# Patient Record
Sex: MALE | Race: WHITE
[De-identification: names, ages, dates, MRNs, and addresses within clinical notes are randomized per-mention and may not be internally consistent; named-entity substitution may affect disease eponyms.]

---

## 2018-01-19 ENCOUNTER — HOSPITAL ENCOUNTER (EMERGENCY)
Dept: HOSPITAL 17 - PHED | Age: 78
Discharge: LEFT BEFORE DISCHARGE | End: 2018-01-19
Payer: MEDICARE

## 2018-01-19 VITALS — WEIGHT: 216.05 LBS | HEIGHT: 72 IN | BODY MASS INDEX: 29.26 KG/M2

## 2018-01-19 VITALS
OXYGEN SATURATION: 95 % | SYSTOLIC BLOOD PRESSURE: 103 MMHG | DIASTOLIC BLOOD PRESSURE: 59 MMHG | RESPIRATION RATE: 18 BRPM | HEART RATE: 71 BPM

## 2018-01-19 VITALS
RESPIRATION RATE: 16 BRPM | SYSTOLIC BLOOD PRESSURE: 144 MMHG | DIASTOLIC BLOOD PRESSURE: 67 MMHG | TEMPERATURE: 97.5 F | HEART RATE: 69 BPM | OXYGEN SATURATION: 94 %

## 2018-01-19 DIAGNOSIS — Z53.21: ICD-10-CM

## 2018-01-19 DIAGNOSIS — K62.5: Primary | ICD-10-CM

## 2018-01-19 DIAGNOSIS — E11.9: ICD-10-CM

## 2018-01-19 DIAGNOSIS — N28.9: ICD-10-CM

## 2018-01-19 DIAGNOSIS — R11.10: ICD-10-CM

## 2018-01-19 DIAGNOSIS — Z79.82: ICD-10-CM

## 2018-01-19 DIAGNOSIS — I10: ICD-10-CM

## 2018-01-19 LAB
ALBUMIN SERPL-MCNC: 3.5 GM/DL (ref 3.4–5)
ALP SERPL-CCNC: 160 U/L (ref 45–117)
ALT SERPL-CCNC: 14 U/L (ref 12–78)
AST SERPL-CCNC: 16 U/L (ref 15–37)
BASOPHILS # BLD AUTO: 0.1 TH/MM3 (ref 0–0.2)
BASOPHILS NFR BLD: 0.6 % (ref 0–2)
BILIRUB SERPL-MCNC: 0.8 MG/DL (ref 0.2–1)
BUN SERPL-MCNC: 19 MG/DL (ref 7–18)
CALCIUM SERPL-MCNC: 9 MG/DL (ref 8.5–10.1)
CHLORIDE SERPL-SCNC: 100 MEQ/L (ref 98–107)
CREAT SERPL-MCNC: 1.5 MG/DL (ref 0.6–1.3)
EOSINOPHIL # BLD: 0.2 TH/MM3 (ref 0–0.4)
EOSINOPHIL NFR BLD: 2.7 % (ref 0–4)
ERYTHROCYTE [DISTWIDTH] IN BLOOD BY AUTOMATED COUNT: 13.3 % (ref 11.6–17.2)
GFR SERPLBLD BASED ON 1.73 SQ M-ARVRAT: 46 ML/MIN (ref 89–?)
GLUCOSE SERPL-MCNC: 176 MG/DL (ref 74–106)
HCO3 BLD-SCNC: 29.8 MEQ/L (ref 21–32)
HCT VFR BLD CALC: 43.8 % (ref 39–51)
HGB BLD-MCNC: 13.9 GM/DL (ref 13–17)
INR PPP: 1 RATIO
LYMPHOCYTES # BLD AUTO: 1.4 TH/MM3 (ref 1–4.8)
LYMPHOCYTES NFR BLD AUTO: 16.9 % (ref 9–44)
MCH RBC QN AUTO: 28.2 PG (ref 27–34)
MCHC RBC AUTO-ENTMCNC: 31.8 % (ref 32–36)
MCV RBC AUTO: 88.8 FL (ref 80–100)
MONOCYTE #: 0.6 TH/MM3 (ref 0–0.9)
MONOCYTES NFR BLD: 6.9 % (ref 0–8)
NEUTROPHILS # BLD AUTO: 6.1 TH/MM3 (ref 1.8–7.7)
NEUTROPHILS NFR BLD AUTO: 72.9 % (ref 16–70)
PLATELET # BLD: 309 TH/MM3 (ref 150–450)
PMV BLD AUTO: 7.8 FL (ref 7–11)
PROT SERPL-MCNC: 7.2 GM/DL (ref 6.4–8.2)
PROTHROMBIN TIME: 10.2 SEC (ref 9.8–11.6)
RBC # BLD AUTO: 4.93 MIL/MM3 (ref 4.5–5.9)
SODIUM SERPL-SCNC: 137 MEQ/L (ref 136–145)
WBC # BLD AUTO: 8.4 TH/MM3 (ref 4–11)

## 2018-01-19 PROCEDURE — 85730 THROMBOPLASTIN TIME PARTIAL: CPT

## 2018-01-19 PROCEDURE — 96375 TX/PRO/DX INJ NEW DRUG ADDON: CPT

## 2018-01-19 PROCEDURE — 86850 RBC ANTIBODY SCREEN: CPT

## 2018-01-19 PROCEDURE — 85610 PROTHROMBIN TIME: CPT

## 2018-01-19 PROCEDURE — 85025 COMPLETE CBC W/AUTO DIFF WBC: CPT

## 2018-01-19 PROCEDURE — 96374 THER/PROPH/DIAG INJ IV PUSH: CPT

## 2018-01-19 PROCEDURE — 99284 EMERGENCY DEPT VISIT MOD MDM: CPT

## 2018-01-19 PROCEDURE — 96361 HYDRATE IV INFUSION ADD-ON: CPT

## 2018-01-19 PROCEDURE — 86901 BLOOD TYPING SEROLOGIC RH(D): CPT

## 2018-01-19 PROCEDURE — C9113 INJ PANTOPRAZOLE SODIUM, VIA: HCPCS

## 2018-01-19 PROCEDURE — 86900 BLOOD TYPING SEROLOGIC ABO: CPT

## 2018-01-19 PROCEDURE — 80053 COMPREHEN METABOLIC PANEL: CPT

## 2018-01-19 NOTE — PD
HPI


Chief Complaint:  GI Complaint


Time Seen by Provider:  08:19


Travel History


International Travel<30 days:  No


Contact w/Intl Traveler<30days:  No


Traveled to known affect area:  No





History of Present Illness


HPI


77-year-old male states a couple months ago he had bright red bleeding per 

rectum but it went away and he did not see a physician for it.  He states again 

over the past couple of days he's had bright red bleeding per rectum.  He 

states he's also had a couple episodes of nonbloody emesis.  He denies any 

abdominal pain, fever or other concurrent complaints.  He denies taking any over

-the-counter anti-inflammatories but states that he has been on tramadol for 

her new pain medication.  Quality is bright red.  He states he's wearing a 

depends because he is having bleeding even when he is not going to the 

bathroom.  He feels worse when he moves around.  He denies other modifying 

factors.  Quality is bright red.  Severity is multiple episodes.





PFSH


Past Medical History


Hx Anticoagulant Therapy:  Yes (ASA 81MG DAILY)


Arthritis:  Yes


Blood Disorders:  No


Cancer:  No


Cardiovascular Problems:  No


High Cholesterol:  Yes


Chemotherapy:  No


Cerebrovascular Accident:  Yes (TIA 2003)


Diabetes:  Yes


Patient Takes Glucophage:  Yes


Endocrine:  Yes


Genitourinary:  Yes


Hypertension:  Yes


Immune Disorder:  No


Musculoskeletal:  Yes


Neurologic:  No


Psychiatric:  No


Reproductive:  No


Respiratory:  No


Migraines:  No


Radiation Therapy:  No


Seizures:  No


Sickle Cell Disease:  No





Past Surgical History


Abdominal Aneurysm Repair:  Yes


Abdominal Surgery:  Yes (HERNIA REPAIR)


Cardiac Surgery:  No


Ear Surgery:  No


Endocrine Surgery:  No


Eye Surgery:  No


Genitourinary Surgery:  Yes (PROSTATE REMOVAL)


Gynecologic Surgery:  No


Oral Surgery:  No


Thoracic Surgery:  No


Other Surgery:  Yes (PROSTATE CA REMOVED/L HERNIA REPAIR)





Social History


Alcohol Use:  No


Tobacco Use:  No


Substance Use:  No





Allergies-Medications


(Allergen,Severity, Reaction):  


Coded Allergies:  


     No Known Allergies (Verified , 2/2/16)


Reported Meds & Prescriptions





Reported Meds & Active Scripts


Active


Reported


Hydrochlorothiazide 25 Mg Tab 25 Mg PO BID


Atorvastatin (Atorvastatin Calcium) 40 Mg Tab 40 Mg PO HS


Tramadol (Tramadol HCl) 50 Mg Tab 50 Mg PO Q4H PRN


Glipizide 10 Mg Tab 10 Mg PO BIDAC


     Take 30 minutes before a meal


Lupron Depot Inj Kit (Leuprolide (3 Month) Inj Kit) 11.25 Mg Kit 11.25 Mg IM 

Q90D


Aspirin Low Dose (Aspirin) 81 Mg Chew 81 Mg CHEW DAILY


Metformin (Metformin HCl) 500 Mg Tab 500 Mg PO BIDPC








Review of Systems


Except as stated in HPI:  all other systems reviewed are Neg





Physical Exam


Narrative


GENERAL: Well-nourished, well-developed patient.


SKIN: Warm and dry.


HEAD: Normocephalic and atraumatic.


EYES: No injection or drainage. 


ENT: No nasal drainage noted. 


NECK: Supple, trachea midline.


CARDIOVASCULAR: Regular rate and rhythm 


RESPIRATORY: Breath sounds equal bilaterally. No accessory muscle use.


GASTROINTESTINAL: Abdomen soft, non-tender, nondistended. 


RECTAL EXAM: Performed with chaperone and after permission. No large external 

hemorrhoid or fissure, stool is bloody


NEUROLOGICAL: Awake. Motor and sensory grossly within normal limits. Normal 

speech.





Data


Data


Last Documented VS





Vital Signs








  Date Time  Temp Pulse Resp B/P (MAP) Pulse Ox O2 Delivery O2 Flow Rate FiO2


 


1/19/18 08:39  71 18 103/59 (74) 95 Room Air  


 


1/19/18 08:08 97.5       








Orders





 Orders


Complete Blood Count With Diff (1/19/18 08:19)


Comprehensive Metabolic Panel (1/19/18 08:19)


Prothrombin Time / Inr (Pt) (1/19/18 08:19)


Act Partial Throm Time (Ptt) (1/19/18 08:19)


Type And Screen (1/19/18 08:19)


Ecg Monitoring (1/19/18 08:19)


Iv Access Insert/Monitor (1/19/18 08:19)


Oximetry (1/19/18 08:19)


Sodium Chloride 0.9% Flush (Ns Flush) (1/19/18 08:30)


Ondansetron Inj (Zofran Inj) (1/19/18 09:00)


Sodium Chlorid 0.9% 500 Ml Inj (Ns 500 M (1/19/18 09:00)


Pantoprazole Inj (Protonix Inj) (1/19/18 09:00)


Ed Discharge Order (1/19/18 11:13)





Labs





Laboratory Tests








Test


  1/19/18


08:38


 


White Blood Count 8.4 TH/MM3 


 


Red Blood Count 4.93 MIL/MM3 


 


Hemoglobin 13.9 GM/DL 


 


Hematocrit 43.8 % 


 


Mean Corpuscular Volume 88.8 FL 


 


Mean Corpuscular Hemoglobin 28.2 PG 


 


Mean Corpuscular Hemoglobin


Concent 31.8 % 


 


 


Red Cell Distribution Width 13.3 % 


 


Platelet Count 309 TH/MM3 


 


Mean Platelet Volume 7.8 FL 


 


Neutrophils (%) (Auto) 72.9 % 


 


Lymphocytes (%) (Auto) 16.9 % 


 


Monocytes (%) (Auto) 6.9 % 


 


Eosinophils (%) (Auto) 2.7 % 


 


Basophils (%) (Auto) 0.6 % 


 


Neutrophils # (Auto) 6.1 TH/MM3 


 


Lymphocytes # (Auto) 1.4 TH/MM3 


 


Monocytes # (Auto) 0.6 TH/MM3 


 


Eosinophils # (Auto) 0.2 TH/MM3 


 


Basophils # (Auto) 0.1 TH/MM3 


 


CBC Comment DIFF FINAL 


 


Differential Comment  


 


Prothrombin Time 10.2 SEC 


 


Prothromb Time International


Ratio 1.0 RATIO 


 


 


Activated Partial


Thromboplast Time 26.7 SEC 


 


 


Blood Urea Nitrogen 19 MG/DL 


 


Creatinine 1.50 MG/DL 


 


Random Glucose 176 MG/DL 


 


Total Protein 7.2 GM/DL 


 


Albumin 3.5 GM/DL 


 


Calcium Level 9.0 MG/DL 


 


Alkaline Phosphatase 160 U/L 


 


Aspartate Amino Transf


(AST/SGOT) 16 U/L 


 


 


Alanine Aminotransferase


(ALT/SGPT) 14 U/L 


 


 


Total Bilirubin 0.8 MG/DL 


 


Sodium Level 137 MEQ/L 


 


Potassium Level 3.9 MEQ/L 


 


Chloride Level 100 MEQ/L 


 


Carbon Dioxide Level 29.8 MEQ/L 


 


Anion Gap 7 MEQ/L 


 


Estimat Glomerular Filtration


Rate 46 ML/MIN 


 











MDM


Medical Decision Making


Medical Screen Exam Complete:  Yes


Emergency Medical Condition:  Yes


Medical Record Reviewed:  Yes (past history confirmed)


Interpretation(s)


CBC & BMP Diagram


1/19/18 08:38








Total Protein 7.2, Albumin 3.5, Calcium Level 9.0, Alkaline Phosphatase 160 H, 

Aspartate Amino Transf (AST/SGOT) 16, Alanine Aminotransferase (ALT/SGPT) 14, 

Total Bilirubin 0.8


Differential Diagnosis


Diverticulosis, gastroenteritis, hemorrhoid, anemia, radiation colitis


Narrative Course


Will check blood work and reevaluate.  Dose with IV fluids and Zofran and 

Protonix





Reviewed labs, will discuss with GI





Patient's wife is now here and lengthy discussion with patient and he does not 

want to stay in the hospital for any more testing.  We'll discuss with his 

primary care physician





ASAEL: The risks of leaving against medical advice without further evaluation 

treatment were discussed with the patient.  These risks include cardiac 

dysfunction, cardiac dysrhythmia, possible heart attack, possible stroke or 

death.  The patient indicated understanding of these risks and appeared to have 

the capacity to make this decision.





[facility] Point of Care


Internal Pos. & Neg. Controls:  Passed


Fecal Specimen Occult Blood:  Positive





Physician Communication


Physician Communication


dr lawson states to order bleeding scan and ct abdomen and observe in the 

hospital


dr cook states since patient wants to leave ama will see in office next week





Diagnosis





 Primary Impression:  


 Rectal bleeding


 Additional Impressions:  


 Vomiting


 Qualified Codes:  R11.10 - Vomiting, unspecified


 Renal insufficiency


Patient Instructions:  General Instructions





***Additional Instructions:  


call dr cook for close follow up


Disposition:  07 AGAINST MEDICAL ADVICE


Condition:  Stable











Magali Patterson MD Jan 19, 2018 08:52